# Patient Record
Sex: MALE | Race: ASIAN | NOT HISPANIC OR LATINO | Employment: FULL TIME | URBAN - METROPOLITAN AREA
[De-identification: names, ages, dates, MRNs, and addresses within clinical notes are randomized per-mention and may not be internally consistent; named-entity substitution may affect disease eponyms.]

---

## 2019-11-13 LAB — SURGICAL PATHOLOGY REPORT: NORMAL

## 2023-01-30 PROBLEM — M25.461 SWELLING OF RIGHT KNEE JOINT: Status: ACTIVE | Noted: 2023-01-30

## 2023-01-30 PROBLEM — K76.0 FATTY INFILTRATION OF LIVER: Status: ACTIVE | Noted: 2023-01-30

## 2023-01-30 PROBLEM — K58.9 IBS (IRRITABLE BOWEL SYNDROME): Status: ACTIVE | Noted: 2023-01-30

## 2023-01-30 PROBLEM — R20.2 RIGHT HAND PARESTHESIA: Status: ACTIVE | Noted: 2023-01-30

## 2023-01-30 PROBLEM — N20.1 CALCULUS OF URETEROVESICAL JUNCTION (UVJ): Status: ACTIVE | Noted: 2023-01-30

## 2023-01-30 PROBLEM — D12.6 ADENOMATOUS POLYP OF COLON: Status: ACTIVE | Noted: 2023-01-30

## 2023-01-30 PROBLEM — R10.12 ABDOMINAL PAIN, LUQ: Status: ACTIVE | Noted: 2023-01-30

## 2023-01-30 PROBLEM — M22.2X1 PATELLOFEMORAL SYNDROME, RIGHT: Status: ACTIVE | Noted: 2023-01-30

## 2023-01-30 PROBLEM — K29.70 GASTRITIS: Status: ACTIVE | Noted: 2023-01-30

## 2023-01-30 PROBLEM — R07.9 CHEST PAIN: Status: ACTIVE | Noted: 2023-01-30

## 2023-01-30 PROBLEM — R20.2 PARESTHESIA OF RIGHT ARM: Status: ACTIVE | Noted: 2023-01-30

## 2023-01-30 PROBLEM — M22.41 CHONDROMALACIA OF RIGHT PATELLA: Status: ACTIVE | Noted: 2023-01-30

## 2023-01-30 PROBLEM — J30.9 ALLERGIC RHINITIS: Status: ACTIVE | Noted: 2023-01-30

## 2023-01-30 PROBLEM — N40.0 BPH (BENIGN PROSTATIC HYPERPLASIA): Status: ACTIVE | Noted: 2023-01-30

## 2023-01-30 PROBLEM — Q62.11 HYDRONEPHROSIS WITH URETEROPELVIC JUNCTION (UPJ) OBSTRUCTION: Status: ACTIVE | Noted: 2023-01-30

## 2023-01-30 PROBLEM — M77.30 CALCANEAL SPUR: Status: ACTIVE | Noted: 2023-01-30

## 2023-01-30 PROBLEM — N52.9 ERECTILE DYSFUNCTION: Status: ACTIVE | Noted: 2023-01-30

## 2023-01-30 PROBLEM — R76.8 HELICOBACTER PYLORI AB+: Status: ACTIVE | Noted: 2023-01-30

## 2023-01-30 PROBLEM — G47.33 OBSTRUCTIVE SLEEP APNEA: Status: ACTIVE | Noted: 2023-01-30

## 2023-01-30 PROBLEM — E78.5 DYSLIPIDEMIA: Status: ACTIVE | Noted: 2023-01-30

## 2023-01-30 PROBLEM — M72.2 PLANTAR FASCIITIS: Status: ACTIVE | Noted: 2023-01-30

## 2023-01-30 PROBLEM — E79.0 ELEVATED URIC ACID IN BLOOD: Status: ACTIVE | Noted: 2023-01-30

## 2023-01-30 PROBLEM — G47.19 EXCESSIVE DAYTIME SLEEPINESS: Status: ACTIVE | Noted: 2023-01-30

## 2023-01-30 PROBLEM — E78.1 HYPERTRIGLYCERIDEMIA: Status: ACTIVE | Noted: 2023-01-30

## 2023-01-30 PROBLEM — K44.9 HIATAL HERNIA: Status: ACTIVE | Noted: 2023-01-30

## 2023-01-30 PROBLEM — T16.9XXA FOREIGN BODY IN EAR: Status: ACTIVE | Noted: 2023-01-30

## 2023-01-30 PROBLEM — R14.3 FLATULENCE: Status: ACTIVE | Noted: 2023-01-30

## 2023-03-31 ENCOUNTER — APPOINTMENT (OUTPATIENT)
Dept: PRIMARY CARE | Facility: CLINIC | Age: 58
End: 2023-03-31
Payer: COMMERCIAL

## 2023-06-02 ENCOUNTER — OFFICE VISIT (OUTPATIENT)
Dept: PRIMARY CARE | Facility: CLINIC | Age: 58
End: 2023-06-02
Payer: COMMERCIAL

## 2023-06-02 VITALS
HEIGHT: 70 IN | BODY MASS INDEX: 29.94 KG/M2 | SYSTOLIC BLOOD PRESSURE: 124 MMHG | WEIGHT: 209.1 LBS | DIASTOLIC BLOOD PRESSURE: 84 MMHG | HEART RATE: 76 BPM | RESPIRATION RATE: 18 BRPM

## 2023-06-02 DIAGNOSIS — Z00.00 WELL ADULT EXAM: Primary | ICD-10-CM

## 2023-06-02 DIAGNOSIS — D12.2 ADENOMATOUS POLYP OF ASCENDING COLON: ICD-10-CM

## 2023-06-02 DIAGNOSIS — E78.5 DYSLIPIDEMIA: ICD-10-CM

## 2023-06-02 DIAGNOSIS — K76.0 FATTY INFILTRATION OF LIVER: ICD-10-CM

## 2023-06-02 LAB
ALANINE AMINOTRANSFERASE (SGPT) (U/L) IN SER/PLAS: 22 U/L (ref 10–52)
ALBUMIN (G/DL) IN SER/PLAS: 4.5 G/DL (ref 3.4–5)
ALKALINE PHOSPHATASE (U/L) IN SER/PLAS: 87 U/L (ref 33–120)
ANION GAP IN SER/PLAS: 11 MMOL/L (ref 10–20)
APPEARANCE, URINE: CLEAR
ASPARTATE AMINOTRANSFERASE (SGOT) (U/L) IN SER/PLAS: 22 U/L (ref 9–39)
BASOPHILS (10*3/UL) IN BLOOD BY AUTOMATED COUNT: 0.05 X10E9/L (ref 0–0.1)
BASOPHILS/100 LEUKOCYTES IN BLOOD BY AUTOMATED COUNT: 1 % (ref 0–2)
BILIRUBIN TOTAL (MG/DL) IN SER/PLAS: 0.8 MG/DL (ref 0–1.2)
BILIRUBIN, URINE: NEGATIVE
BLOOD, URINE: NEGATIVE
CALCIDIOL (25 OH VITAMIN D3) (NG/ML) IN SER/PLAS: 24 NG/ML
CALCIUM (MG/DL) IN SER/PLAS: 8.5 MG/DL (ref 8.6–10.6)
CARBON DIOXIDE, TOTAL (MMOL/L) IN SER/PLAS: 26 MMOL/L (ref 21–32)
CHLORIDE (MMOL/L) IN SER/PLAS: 108 MMOL/L (ref 98–107)
CHOLESTEROL (MG/DL) IN SER/PLAS: 163 MG/DL (ref 0–199)
CHOLESTEROL IN HDL (MG/DL) IN SER/PLAS: 39.2 MG/DL
CHOLESTEROL/HDL RATIO: 4.2
COLOR, URINE: YELLOW
CREATININE (MG/DL) IN SER/PLAS: 0.79 MG/DL (ref 0.5–1.3)
EOSINOPHILS (10*3/UL) IN BLOOD BY AUTOMATED COUNT: 0.38 X10E9/L (ref 0–0.7)
EOSINOPHILS/100 LEUKOCYTES IN BLOOD BY AUTOMATED COUNT: 7.4 % (ref 0–6)
ERYTHROCYTE DISTRIBUTION WIDTH (RATIO) BY AUTOMATED COUNT: 13.3 % (ref 11.5–14.5)
ERYTHROCYTE MEAN CORPUSCULAR HEMOGLOBIN CONCENTRATION (G/DL) BY AUTOMATED: 32.5 G/DL (ref 32–36)
ERYTHROCYTE MEAN CORPUSCULAR VOLUME (FL) BY AUTOMATED COUNT: 91 FL (ref 80–100)
ERYTHROCYTES (10*6/UL) IN BLOOD BY AUTOMATED COUNT: 5.22 X10E12/L (ref 4.5–5.9)
GFR MALE: >90 ML/MIN/1.73M2
GLUCOSE (MG/DL) IN SER/PLAS: 94 MG/DL (ref 74–99)
GLUCOSE, URINE: NEGATIVE MG/DL
HEMATOCRIT (%) IN BLOOD BY AUTOMATED COUNT: 47.7 % (ref 41–52)
HEMOGLOBIN (G/DL) IN BLOOD: 15.5 G/DL (ref 13.5–17.5)
IMMATURE GRANULOCYTES/100 LEUKOCYTES IN BLOOD BY AUTOMATED COUNT: 0.2 % (ref 0–0.9)
KETONES, URINE: NEGATIVE MG/DL
LDL: 87 MG/DL (ref 0–99)
LEUKOCYTE ESTERASE, URINE: NEGATIVE
LEUKOCYTES (10*3/UL) IN BLOOD BY AUTOMATED COUNT: 5.1 X10E9/L (ref 4.4–11.3)
LYMPHOCYTES (10*3/UL) IN BLOOD BY AUTOMATED COUNT: 1.71 X10E9/L (ref 1.2–4.8)
LYMPHOCYTES/100 LEUKOCYTES IN BLOOD BY AUTOMATED COUNT: 33.5 % (ref 13–44)
MONOCYTES (10*3/UL) IN BLOOD BY AUTOMATED COUNT: 0.52 X10E9/L (ref 0.1–1)
MONOCYTES/100 LEUKOCYTES IN BLOOD BY AUTOMATED COUNT: 10.2 % (ref 2–10)
NEUTROPHILS (10*3/UL) IN BLOOD BY AUTOMATED COUNT: 2.44 X10E9/L (ref 1.2–7.7)
NEUTROPHILS/100 LEUKOCYTES IN BLOOD BY AUTOMATED COUNT: 47.7 % (ref 40–80)
NITRITE, URINE: NEGATIVE
NRBC (PER 100 WBCS) BY AUTOMATED COUNT: 0 /100 WBC (ref 0–0)
PH, URINE: 5 (ref 5–8)
PLATELETS (10*3/UL) IN BLOOD AUTOMATED COUNT: 281 X10E9/L (ref 150–450)
POTASSIUM (MMOL/L) IN SER/PLAS: 4.1 MMOL/L (ref 3.5–5.3)
PROTEIN TOTAL: 7.3 G/DL (ref 6.4–8.2)
PROTEIN, URINE: NEGATIVE MG/DL
SODIUM (MMOL/L) IN SER/PLAS: 141 MMOL/L (ref 136–145)
SPECIFIC GRAVITY, URINE: 1.02 (ref 1–1.03)
THYROTROPIN (MIU/L) IN SER/PLAS BY DETECTION LIMIT <= 0.05 MIU/L: 1.37 MIU/L (ref 0.44–3.98)
TRIGLYCERIDE (MG/DL) IN SER/PLAS: 182 MG/DL (ref 0–149)
UREA NITROGEN (MG/DL) IN SER/PLAS: 15 MG/DL (ref 6–23)
UROBILINOGEN, URINE: <2 MG/DL (ref 0–1.9)
VLDL: 36 MG/DL (ref 0–40)

## 2023-06-02 PROCEDURE — 1036F TOBACCO NON-USER: CPT | Performed by: INTERNAL MEDICINE

## 2023-06-02 PROCEDURE — 99396 PREV VISIT EST AGE 40-64: CPT | Performed by: INTERNAL MEDICINE

## 2023-06-02 PROCEDURE — 84443 ASSAY THYROID STIM HORMONE: CPT

## 2023-06-02 PROCEDURE — 81003 URINALYSIS AUTO W/O SCOPE: CPT

## 2023-06-02 PROCEDURE — 93000 ELECTROCARDIOGRAM COMPLETE: CPT | Performed by: INTERNAL MEDICINE

## 2023-06-02 PROCEDURE — 80053 COMPREHEN METABOLIC PANEL: CPT

## 2023-06-02 PROCEDURE — 80061 LIPID PANEL: CPT

## 2023-06-02 PROCEDURE — 85025 COMPLETE CBC W/AUTO DIFF WBC: CPT

## 2023-06-02 PROCEDURE — 82306 VITAMIN D 25 HYDROXY: CPT

## 2023-06-02 ASSESSMENT — ENCOUNTER SYMPTOMS
ADENOPATHY: 0
ARTHRALGIAS: 0
CONSTIPATION: 0
DIARRHEA: 0
FLANK PAIN: 0
DIZZINESS: 0
ACTIVITY CHANGE: 0
SLEEP DISTURBANCE: 0
EYE PAIN: 0
DYSURIA: 0
MYALGIAS: 0
LIGHT-HEADEDNESS: 0
DYSPHORIC MOOD: 0
COUGH: 0
EYE DISCHARGE: 0
CHOKING: 0
WEAKNESS: 0
PALPITATIONS: 0
VOMITING: 0
CONFUSION: 0
BRUISES/BLEEDS EASILY: 0
COLOR CHANGE: 0
CHEST TIGHTNESS: 0
FACIAL SWELLING: 0
JOINT SWELLING: 0
NAUSEA: 0
NERVOUS/ANXIOUS: 0
TREMORS: 0
WOUND: 0
BACK PAIN: 0
UNEXPECTED WEIGHT CHANGE: 0
FREQUENCY: 0
APPETITE CHANGE: 0
ABDOMINAL PAIN: 0
SORE THROAT: 0
SINUS PRESSURE: 0
NECK STIFFNESS: 0
NUMBNESS: 0
WHEEZING: 0
FATIGUE: 0
SEIZURES: 0
HEMATURIA: 0
RHINORRHEA: 1
TROUBLE SWALLOWING: 0
VOICE CHANGE: 0
ABDOMINAL DISTENTION: 0
EYE REDNESS: 0
DIFFICULTY URINATING: 0
NECK PAIN: 0
FEVER: 0
EYE ITCHING: 1
BLOOD IN STOOL: 0
SHORTNESS OF BREATH: 0
SINUS PAIN: 0
CHILLS: 0
HEADACHES: 0
DIAPHORESIS: 0

## 2023-06-02 NOTE — PROGRESS NOTES
"Subjective   Patient ID: Oliver George is a 57 y.o. male who presents for Annual Exam.    Abdominal pain has resolved.     Exercise includes walks after dinner 5 times per week.     He still has occasional mild left TMJ discomfort but none today.          Review of Systems   Constitutional:  Negative for activity change, appetite change, chills, diaphoresis, fatigue, fever and unexpected weight change.   HENT:  Positive for congestion, postnasal drip and rhinorrhea. Negative for ear discharge, ear pain, facial swelling, hearing loss, sinus pressure, sinus pain, sore throat, trouble swallowing and voice change.    Eyes:  Positive for itching. Negative for pain, discharge, redness and visual disturbance.   Respiratory:  Negative for cough, choking, chest tightness, shortness of breath and wheezing.    Cardiovascular:  Negative for chest pain, palpitations and leg swelling.   Gastrointestinal:  Negative for abdominal distention, abdominal pain, blood in stool, constipation, diarrhea, nausea and vomiting.   Endocrine: Negative for cold intolerance and heat intolerance.   Genitourinary:  Negative for difficulty urinating, dysuria, flank pain, frequency, hematuria, testicular pain and urgency.   Musculoskeletal:  Negative for arthralgias, back pain, gait problem, joint swelling, myalgias, neck pain and neck stiffness.   Skin:  Negative for color change, pallor, rash and wound.   Allergic/Immunologic: Positive for environmental allergies.   Neurological:  Negative for dizziness, tremors, seizures, syncope, weakness, light-headedness, numbness and headaches.   Hematological:  Negative for adenopathy. Does not bruise/bleed easily.   Psychiatric/Behavioral:  Negative for behavioral problems, confusion, dysphoric mood, sleep disturbance and suicidal ideas. The patient is not nervous/anxious.        Objective   /84 (BP Location: Left arm, Patient Position: Sitting)   Pulse 76   Resp 18   Ht 1.778 m (5' 10\")   Wt 94.8 kg " (209 lb 1.6 oz)   BMI 30.00 kg/m²     Physical Exam  Constitutional:       Appearance: Normal appearance. He is normal weight.   HENT:      Head: Normocephalic and atraumatic.      Right Ear: Tympanic membrane, ear canal and external ear normal.      Left Ear: Tympanic membrane, ear canal and external ear normal.      Nose: Congestion present.      Mouth/Throat:      Mouth: Mucous membranes are moist.      Pharynx: Oropharynx is clear. No posterior oropharyngeal erythema.   Eyes:      General: No scleral icterus.     Extraocular Movements: Extraocular movements intact.      Conjunctiva/sclera: Conjunctivae normal.      Pupils: Pupils are equal, round, and reactive to light.   Neck:      Vascular: No carotid bruit.   Cardiovascular:      Rate and Rhythm: Normal rate and regular rhythm.      Pulses: Normal pulses.      Heart sounds: Normal heart sounds.   Pulmonary:      Effort: Pulmonary effort is normal.      Breath sounds: Normal breath sounds. No wheezing, rhonchi or rales.   Abdominal:      General: Bowel sounds are normal. There is no distension.      Palpations: Abdomen is soft. There is no mass.      Tenderness: There is no abdominal tenderness.      Hernia: No hernia is present.   Genitourinary:     Penis: Normal.       Testes: Normal.      Prostate: Normal.      Rectum: Normal. Guaiac result negative.   Musculoskeletal:         General: Normal range of motion.      Cervical back: Normal range of motion.      Right lower leg: No edema.      Left lower leg: No edema.   Skin:     General: Skin is warm and dry.      Findings: No rash.   Neurological:      General: No focal deficit present.      Mental Status: He is alert and oriented to person, place, and time. Mental status is at baseline.      Cranial Nerves: No cranial nerve deficit.      Sensory: No sensory deficit.      Motor: No weakness (5/5 right LE motor strength).      Gait: Gait normal.      Deep Tendon Reflexes: Reflexes normal.   Psychiatric:          Mood and Affect: Mood normal.         Behavior: Behavior normal.         Thought Content: Thought content normal.         Judgment: Judgment normal.         Assessment/Plan   Problem List Items Addressed This Visit          Digestive    Adenomatous polyp of colon     Repeat colonoscopy 11/2024         Fatty infiltration of liver     Avoid hepatotoxins, urge weight loss         Relevant Orders    Comprehensive Metabolic Panel (Completed)       Other    Dyslipidemia     CT cardiac scoring 1/18/18 CCS=0 thus continue lifestyle modifications with LF diet/ exercise and weight loss         Relevant Orders    Lipid Panel (Completed)    Well adult exam - Primary     Fasting BW/ UA ordered  EKG done in office  Colonoscopy 11/11/2019, repeat 5 years             Relevant Orders    CBC and Auto Differential (Completed)    Comprehensive Metabolic Panel (Completed)    ECG 12 lead (Completed)    Lipid Panel (Completed)    TSH with reflex to Free T4 if abnormal (Completed)    Urinalysis with Reflex Microscopic and Culture    Vitamin D, Total (Completed)

## 2023-06-03 NOTE — ASSESSMENT & PLAN NOTE
CT cardiac scoring 1/18/18 CCS=0 thus continue lifestyle modifications with LF diet/ exercise and weight loss

## 2023-06-09 ENCOUNTER — TELEPHONE (OUTPATIENT)
Dept: PRIMARY CARE | Facility: CLINIC | Age: 58
End: 2023-06-09
Payer: COMMERCIAL

## 2024-06-18 ENCOUNTER — APPOINTMENT (OUTPATIENT)
Dept: PRIMARY CARE | Facility: CLINIC | Age: 59
End: 2024-06-18
Payer: COMMERCIAL